# Patient Record
Sex: MALE | Race: WHITE
[De-identification: names, ages, dates, MRNs, and addresses within clinical notes are randomized per-mention and may not be internally consistent; named-entity substitution may affect disease eponyms.]

---

## 2017-05-25 ENCOUNTER — HOSPITAL ENCOUNTER (OUTPATIENT)
Dept: HOSPITAL 53 - M SFHCCLAY | Age: 67
End: 2017-05-25
Attending: NURSE PRACTITIONER
Payer: MEDICARE

## 2017-05-25 DIAGNOSIS — E78.4: ICD-10-CM

## 2017-05-25 DIAGNOSIS — I10: Primary | ICD-10-CM

## 2017-05-25 DIAGNOSIS — E11.69: ICD-10-CM

## 2017-05-25 DIAGNOSIS — E55.9: ICD-10-CM

## 2017-05-25 LAB
ALBUMIN SERPL BCG-MCNC: 3.4 GM/DL (ref 3.2–5.2)
ALBUMIN/GLOB SERPL: 0.94 {RATIO} (ref 1–1.93)
ALP SERPL-CCNC: 57 U/L (ref 45–117)
ALT SERPL W P-5'-P-CCNC: 28 U/L (ref 12–78)
ANION GAP SERPL CALC-SCNC: 8 MEQ/L (ref 8–16)
AST SERPL-CCNC: 20 U/L (ref 15–37)
BILIRUB SERPL-MCNC: 0.6 MG/DL (ref 0.2–1)
BUN SERPL-MCNC: 17 MG/DL (ref 7–18)
CALCIUM SERPL-MCNC: 8.6 MG/DL (ref 8.8–10.2)
CHLORIDE SERPL-SCNC: 100 MEQ/L (ref 98–107)
CHOLEST SERPL-MCNC: 179 MG/DL (ref ?–200)
CO2 SERPL-SCNC: 32 MEQ/L (ref 21–32)
CREAT SERPL-MCNC: 0.77 MG/DL (ref 0.7–1.3)
ERYTHROCYTE [DISTWIDTH] IN BLOOD BY AUTOMATED COUNT: 12.9 % (ref 11.5–14.5)
EST. AVERAGE GLUCOSE BLD GHB EST-MCNC: 131 MG/DL (ref 60–110)
GFR SERPL CREATININE-BSD FRML MDRD: > 60 ML/MIN/{1.73_M2} (ref 49–?)
GLUCOSE SERPL-MCNC: 119 MG/DL (ref 80–110)
MCH RBC QN AUTO: 31 PG (ref 27–33)
MCHC RBC AUTO-ENTMCNC: 34.8 G/DL (ref 32–36.5)
MCV RBC AUTO: 89.2 FL (ref 80–96)
PLATELET # BLD AUTO: 267 K/MM3 (ref 150–450)
POTASSIUM SERPL-SCNC: 3.3 MEQ/L (ref 3.5–5.1)
PROT SERPL-MCNC: 7 GM/DL (ref 6.4–8.2)
SODIUM SERPL-SCNC: 140 MEQ/L (ref 136–145)
TRIGL SERPL-MCNC: 134 MG/DL (ref ?–150)
WBC # BLD AUTO: 7.9 K/MM3 (ref 4–10)

## 2017-05-30 ENCOUNTER — HOSPITAL ENCOUNTER (OUTPATIENT)
Dept: HOSPITAL 53 - M SFHCCLAY | Age: 67
End: 2017-05-30
Attending: NURSE PRACTITIONER
Payer: MEDICARE

## 2017-05-30 DIAGNOSIS — D64.9: Primary | ICD-10-CM

## 2017-05-30 DIAGNOSIS — I10: ICD-10-CM

## 2017-05-30 LAB
ERYTHROCYTE [DISTWIDTH] IN BLOOD BY AUTOMATED COUNT: 13 % (ref 11.5–14.5)
FERRITIN SERPL-MCNC: 308 NG/ML (ref 26–388)
FOLATE SERPL-MCNC: 11.5 NG/ML (ref 5.4–?)
IRON SATN MFR SERPL: 22.5 % (ref 19.7–37.4)
MCH RBC QN AUTO: 30.9 PG (ref 27–33)
MCHC RBC AUTO-ENTMCNC: 35.1 G/DL (ref 32–36.5)
MCV RBC AUTO: 88.1 FL (ref 80–96)
PLATELET # BLD AUTO: 259 K/MM3 (ref 150–450)
POTASSIUM SERPL-SCNC: 3.1 MEQ/L (ref 3.5–5.1)
RETIC HEMOGLOBIN CONTENT CHR: 34.3 PG (ref 24–36)
RETICS/RBC NFR AUTO: 98 X10(9)/L (ref 17–77)
RETICS/RBC NFR: 2.4 % (ref 0.5–1.5)
TIBC SERPL-MCNC: 262 UG/DL (ref 250–450)
VIT B12 SERPL-MCNC: 449 PG/ML (ref 247–911)
WBC # BLD AUTO: 8.5 K/MM3 (ref 4–10)

## 2017-05-30 PROCEDURE — 85027 COMPLETE CBC AUTOMATED: CPT

## 2017-05-30 PROCEDURE — 82607 VITAMIN B-12: CPT

## 2017-05-30 PROCEDURE — 85046 RETICYTE/HGB CONCENTRATE: CPT

## 2017-05-30 PROCEDURE — 84132 ASSAY OF SERUM POTASSIUM: CPT

## 2017-05-30 PROCEDURE — 82728 ASSAY OF FERRITIN: CPT

## 2017-05-30 PROCEDURE — 82746 ASSAY OF FOLIC ACID SERUM: CPT

## 2017-05-30 PROCEDURE — 83550 IRON BINDING TEST: CPT

## 2017-06-13 ENCOUNTER — HOSPITAL ENCOUNTER (OUTPATIENT)
Dept: HOSPITAL 53 - M SFHCCLAY | Age: 67
End: 2017-06-13
Attending: NURSE PRACTITIONER
Payer: MEDICARE

## 2017-06-13 DIAGNOSIS — E11.69: ICD-10-CM

## 2017-06-13 DIAGNOSIS — E55.9: ICD-10-CM

## 2017-06-13 DIAGNOSIS — I10: Primary | ICD-10-CM

## 2017-06-13 DIAGNOSIS — E78.4: ICD-10-CM

## 2017-06-13 LAB
CALCIUM SERPL-MCNC: 9.2 MG/DL (ref 8.8–10.2)
CHOLEST SERPL-MCNC: 177 MG/DL (ref ?–200)
EST. AVERAGE GLUCOSE BLD GHB EST-MCNC: 126 MG/DL (ref 60–110)
POTASSIUM SERPL-SCNC: 3.9 MEQ/L (ref 3.5–5.1)
TRIGL SERPL-MCNC: 118 MG/DL (ref ?–150)

## 2017-06-23 ENCOUNTER — HOSPITAL ENCOUNTER (OUTPATIENT)
Dept: HOSPITAL 53 - M SFHCCLAY | Age: 67
End: 2017-06-23
Attending: NURSE PRACTITIONER
Payer: MEDICARE

## 2017-06-23 DIAGNOSIS — D64.9: Primary | ICD-10-CM

## 2017-06-23 LAB
ERYTHROCYTE [DISTWIDTH] IN BLOOD BY AUTOMATED COUNT: 12.8 % (ref 11.5–14.5)
IRON SATN MFR SERPL: 30 % (ref 19.7–37.4)
MCH RBC QN AUTO: 31.2 PG (ref 27–33)
MCHC RBC AUTO-ENTMCNC: 35.3 G/DL (ref 32–36.5)
MCV RBC AUTO: 88.4 FL (ref 80–96)
PLATELET # BLD AUTO: 239 K/MM3 (ref 150–450)
RETIC HEMOGLOBIN CONTENT CHR: 33.5 PG (ref 24–36)
RETICS/RBC NFR AUTO: 92 X10(9)/L (ref 17–77)
RETICS/RBC NFR: 2.2 % (ref 0.5–1.5)
TIBC SERPL-MCNC: 243 UG/DL (ref 250–450)
WBC # BLD AUTO: 7.6 K/MM3 (ref 4–10)

## 2017-06-29 ENCOUNTER — HOSPITAL ENCOUNTER (OUTPATIENT)
Dept: HOSPITAL 53 - M OPP | Age: 67
Discharge: HOME | End: 2017-06-29
Attending: SURGERY
Payer: MEDICARE

## 2017-06-29 VITALS — WEIGHT: 255 LBS | HEIGHT: 70 IN | BODY MASS INDEX: 36.51 KG/M2

## 2017-06-29 VITALS — DIASTOLIC BLOOD PRESSURE: 65 MMHG | SYSTOLIC BLOOD PRESSURE: 120 MMHG

## 2017-06-29 DIAGNOSIS — D50.9: Primary | ICD-10-CM

## 2017-06-29 DIAGNOSIS — E66.9: ICD-10-CM

## 2017-06-29 DIAGNOSIS — E78.00: ICD-10-CM

## 2017-06-29 DIAGNOSIS — Z87.891: ICD-10-CM

## 2017-06-29 DIAGNOSIS — K21.9: ICD-10-CM

## 2017-06-29 DIAGNOSIS — I10: ICD-10-CM

## 2017-06-29 DIAGNOSIS — Z79.899: ICD-10-CM

## 2017-06-29 DIAGNOSIS — E11.9: ICD-10-CM

## 2017-06-29 DIAGNOSIS — I45.10: ICD-10-CM

## 2017-06-29 DIAGNOSIS — N40.0: ICD-10-CM

## 2017-06-29 NOTE — ROOR
________________________________________________________________________________

Patient Name: Mp Hooper               Procedure Date: 6/29/2017 9:20 AM

MRN: [id number]                          Account Number: [id number]

YOB: 1950                Age: 67

Room: [facility]                            Gender: Male

Note Status: Finalized                 

________________________________________________________________________________

 

Procedure:           Colonoscopy

Indications:         Last colonoscopy: 2013, Iron deficiency anemia

Providers:           Brian Contreras MD

Referring MD:        Nahomy Gutierrez NP

Requesting Provider: 

Medicines:           Monitored Anesthesia Care

Complications:       No immediate complications.

________________________________________________________________________________

Procedure:           Pre-Anesthesia Assessment:

                     - Prior to the procedure, a History and Physical was 

                     performed, and patient medications and allergies were 

                     reviewed. The patient is competent. The risks and 

                     benefits of the procedure and the sedation options and 

                     risks were discussed with the patient. All questions were 

                     answered and informed consent was obtained. Patient 

                     identification and proposed procedure were verified by 

                     the physician, the nurse and the anesthetist in the 

                     procedure room. Mental Status Examination: alert and 

                     oriented. Airway Examination: normal oropharyngeal airway 

                     and neck mobility. CV Examination: regular rate and 

                     rhythm. Prophylactic Antibiotics: The patient does not 

                     require prophylactic antibiotics. Prior Anticoagulants: 

                     The patient has taken no previous anticoagulant or 

                     antiplatelet agents. ASA Grade Assessment: III - A 

                     patient with severe systemic disease. After reviewing the 

                     risks and benefits, the patient was deemed in 

                     satisfactory condition to undergo the procedure. The 

                     anesthesia plan was to use monitored anesthesia care 

                     (MAC). Immediately prior to administration of 

                     medications, the patient was re-assessed for adequacy to 

                     receive sedatives. The heart rate, respiratory rate, 

                     oxygen saturations, blood pressure, adequacy of pulmonary 

                     ventilation, and response to care were monitored 

                     throughout the procedure. The physical status of the 

                     patient was re-assessed after the procedure.

                     The was introduced through the anus and advanced to the 

                     cecum, identified by appendiceal orifice and ileocecal 

                     valve. The colonoscopy was performed without difficulty. 

                     The patient tolerated the procedure well. The quality of 

                     the bowel preparation was excellent.

                                                                                

Findings:

     The perianal and digital rectal examinations were normal.

     The colon (entire examined portion) appeared normal.

                                                                                

Impression:          - The entire examined colon is normal.

                     - No specimens collected.

Recommendation:      - Discharge patient to home.

                     - Resume previous diet.

                     - Continue present medications.

                     - Return to referring physician PRN.

                                                                                

 

__________________

Brian Contreras MD

6/29/2017 10:15:53 AM

Number of Addenda: 0

 

Note Initiated On: 6/29/2017 9:20 AM

Estimated Blood Loss:

     Estimated blood loss: none.

## 2017-06-29 NOTE — ROOR
________________________________________________________________________________

Patient Name: Mp Hooper               Procedure Date: 6/29/2017 9:19 AM

MRN: [id number]                          Account Number: [id number]

YOB: 1950                Age: 67

Room: [facility]                            Gender: Male

Note Status: Finalized                 

________________________________________________________________________________

 

Procedure:           Upper GI endoscopy

Indications:         Iron deficiency anemia

Providers:           Brian Contreras MD

Referring MD:        Nahomy Gutierrez NP

Requesting Provider: 

Medicines:           Monitored Anesthesia Care

Complications:       No immediate complications.

________________________________________________________________________________

Procedure:           Pre-Anesthesia Assessment:

                     - Prior to the procedure, a History and Physical was 

                     performed, and patient medications and allergies were 

                     reviewed. The patient is competent. The risks and 

                     benefits of the procedure and the sedation options and 

                     risks were discussed with the patient. All questions were 

                     answered and informed consent was obtained. Patient 

                     identification and proposed procedure were verified by 

                     the physician, the nurse and the anesthetist in the 

                     procedure room. Mental Status Examination: alert and 

                     oriented. Airway Examination: normal oropharyngeal airway 

                     and neck mobility. CV Examination: regular rate and 

                     rhythm. Prophylactic Antibiotics: The patient does not 

                     require prophylactic antibiotics. Prior Anticoagulants: 

                     The patient has taken no previous anticoagulant or 

                     antiplatelet agents. ASA Grade Assessment: III - A 

                     patient with severe systemic disease. After reviewing the 

                     risks and benefits, the patient was deemed in 

                     satisfactory condition to undergo the procedure. The 

                     anesthesia plan was to use monitored anesthesia care 

                     (MAC). Immediately prior to administration of 

                     medications, the patient was re-assessed for adequacy to 

                     receive sedatives. The heart rate, respiratory rate, 

                     oxygen saturations, blood pressure, adequacy of pulmonary 

                     ventilation, and response to care were monitored 

                     throughout the procedure. The physical status of the 

                     patient was re-assessed after the procedure.

                     The Endoscope was introduced through the mouth, and 

                     advanced to the second part of duodenum. The upper GI 

                     endoscopy was accomplished without difficulty. The 

                     patient tolerated the procedure well.

                                                                                

Findings:

     The first portion of the duodenum and second portion of the duodenum were 

     normal.

     Localized mucosal changes characterized by flattening and smoothness that 

     was suggestive of scar from a prior ulcer were found at the incisura.

     The exam of the stomach was otherwise normal.

     The examined esophagus was normal.

                                                                                

Impression:          - Normal first portion of the duodenum and second portion 

                     of the duodenum.

                     - Flattening and smoothness that was suggestive of scar 

                     from a prior ulcer mucosa in the incisura.

                     - Normal esophagus.

                     - No specimens collected.

Recommendation:      - Discharge patient to home.

                     - Resume previous diet.

                     - Continue present medications.

                                                                                

 

__________________

Brian Contreras MD

6/29/2017 10:10:55 AM

Number of Addenda: 0

 

Note Initiated On: 6/29/2017 9:19 AM

Estimated Blood Loss:

     Estimated blood loss: none.

## 2017-07-10 ENCOUNTER — HOSPITAL ENCOUNTER (OUTPATIENT)
Dept: HOSPITAL 53 - M SFHCCLAY | Age: 67
End: 2017-07-10
Attending: NURSE PRACTITIONER
Payer: MEDICARE

## 2017-07-10 DIAGNOSIS — D64.9: Primary | ICD-10-CM

## 2017-07-10 DIAGNOSIS — I10: ICD-10-CM

## 2017-07-10 DIAGNOSIS — Z12.5: ICD-10-CM

## 2017-07-10 LAB
ERYTHROCYTE [DISTWIDTH] IN BLOOD BY AUTOMATED COUNT: 12.7 % (ref 11.5–14.5)
IRON SATN MFR SERPL: 33.3 % (ref 19.7–37.4)
MCH RBC QN AUTO: 31.4 PG (ref 27–33)
MCHC RBC AUTO-ENTMCNC: 35.4 G/DL (ref 32–36.5)
MCV RBC AUTO: 88.7 FL (ref 80–96)
PLATELET # BLD AUTO: 252 K/MM3 (ref 150–450)
POTASSIUM SERPL-SCNC: 3.9 MEQ/L (ref 3.5–5.1)
TIBC SERPL-MCNC: 255 UG/DL (ref 250–450)
WBC # BLD AUTO: 7.7 K/MM3 (ref 4–10)

## 2017-07-10 PROCEDURE — 84132 ASSAY OF SERUM POTASSIUM: CPT

## 2017-07-10 PROCEDURE — 85027 COMPLETE CBC AUTOMATED: CPT

## 2017-07-10 PROCEDURE — 83550 IRON BINDING TEST: CPT

## 2017-09-18 ENCOUNTER — HOSPITAL ENCOUNTER (OUTPATIENT)
Dept: HOSPITAL 53 - M SFHCCLAY | Age: 67
End: 2017-09-18
Attending: NURSE PRACTITIONER
Payer: MEDICARE

## 2017-09-18 DIAGNOSIS — E78.4: ICD-10-CM

## 2017-09-18 DIAGNOSIS — E55.9: Primary | ICD-10-CM

## 2017-09-18 DIAGNOSIS — K21.9: ICD-10-CM

## 2017-09-18 DIAGNOSIS — E11.69: ICD-10-CM

## 2017-09-18 DIAGNOSIS — I10: ICD-10-CM

## 2017-09-18 LAB
CALCIUM SERPL-MCNC: 9 MG/DL (ref 8.8–10.2)
CHOLEST SERPL-MCNC: 177 MG/DL (ref ?–200)
ERYTHROCYTE [DISTWIDTH] IN BLOOD BY AUTOMATED COUNT: 12.9 % (ref 11.5–14.5)
EST. AVERAGE GLUCOSE BLD GHB EST-MCNC: 126 MG/DL (ref 60–110)
IRON SATN MFR SERPL: 32.9 % (ref 19.7–50)
MCH RBC QN AUTO: 30.7 PG (ref 27–33)
MCHC RBC AUTO-ENTMCNC: 34.1 G/DL (ref 32–36.5)
MCV RBC AUTO: 90 FL (ref 80–96)
PLATELET # BLD AUTO: 234 K/MM3 (ref 150–450)
POTASSIUM SERPL-SCNC: 3.9 MEQ/L (ref 3.5–5.1)
TIBC SERPL-MCNC: 231 UG/DL (ref 250–450)
TRIGL SERPL-MCNC: 170 MG/DL (ref ?–150)
WBC # BLD AUTO: 6.8 K/MM3 (ref 4–10)

## 2017-09-22 ENCOUNTER — HOSPITAL ENCOUNTER (OUTPATIENT)
Dept: HOSPITAL 53 - M LABSMT | Age: 67
End: 2017-09-22
Attending: SPECIALIST
Payer: MEDICARE

## 2017-09-22 DIAGNOSIS — N40.0: Primary | ICD-10-CM

## 2017-09-26 LAB — PSA SERPL-MCNC: 3.7 NG/ML (ref 0–4)

## 2017-11-30 ENCOUNTER — HOSPITAL ENCOUNTER (OUTPATIENT)
Dept: HOSPITAL 53 - M SFHCCLAY | Age: 67
End: 2017-11-30
Attending: NURSE PRACTITIONER
Payer: MEDICARE

## 2017-11-30 DIAGNOSIS — E55.9: ICD-10-CM

## 2017-11-30 DIAGNOSIS — K21.9: Primary | ICD-10-CM

## 2017-11-30 DIAGNOSIS — E11.69: ICD-10-CM

## 2017-11-30 DIAGNOSIS — E78.4: ICD-10-CM

## 2017-11-30 LAB
CHOLEST SERPL-MCNC: 185 MG/DL (ref ?–200)
ERYTHROCYTE [DISTWIDTH] IN BLOOD BY AUTOMATED COUNT: 12.7 % (ref 11.5–14.5)
EST. AVERAGE GLUCOSE BLD GHB EST-MCNC: 134 MG/DL (ref 60–110)
IRON SATN MFR SERPL: 35.5 % (ref 19.7–50)
MCH RBC QN AUTO: 30.4 PG (ref 27–33)
MCHC RBC AUTO-ENTMCNC: 34.6 G/DL (ref 32–36.5)
MCV RBC AUTO: 87.8 FL (ref 80–96)
NRBC BLD AUTO-RTO: 0 % (ref 0–0)
PLATELET # BLD AUTO: 240 10^3/UL (ref 150–450)
TIBC SERPL-MCNC: 256 UG/DL (ref 250–450)
TRIGL SERPL-MCNC: 190 MG/DL (ref ?–150)
WBC # BLD AUTO: 6.5 10^3/UL (ref 4–10)

## 2018-04-13 ENCOUNTER — HOSPITAL ENCOUNTER (OUTPATIENT)
Dept: HOSPITAL 53 - M SFHCCLAY | Age: 68
End: 2018-04-13
Attending: NURSE PRACTITIONER
Payer: MEDICARE

## 2018-04-13 DIAGNOSIS — E55.9: ICD-10-CM

## 2018-04-13 DIAGNOSIS — E78.4: ICD-10-CM

## 2018-04-13 DIAGNOSIS — E11.69: ICD-10-CM

## 2018-04-13 DIAGNOSIS — K21.9: Primary | ICD-10-CM

## 2018-04-13 LAB
25(OH)D3 SERPL-MCNC: 32.1 NG/ML (ref 30–100)
CHOLEST SERPL-MCNC: 175 MG/DL (ref ?–200)
CHOLESTEROL RISK RATIO: 3.98 (ref ?–5)
EST. AVERAGE GLUCOSE BLD GHB EST-MCNC: 131 MG/DL (ref 60–110)
FERRITIN: 242 NG/ML (ref 26–388)
FOLATE SERPL-MCNC: 13.5 NG/ML (ref 5.4–?)
HDLC SERPL-MCNC: 44 MG/DL (ref 40–?)
HEMATOCRIT: 37.3 % (ref 42–52)
HEMOGLOBIN: 13.1 G/DL (ref 13.5–17.5)
IRON (FE): 72 UG/DL (ref 65–175)
IRON SATN MFR SERPL: 29.9 % (ref 19.7–50)
LDL CHOLESTEROL: 104.4 MG/DL (ref ?–100)
MEAN CORPUSCULAR HEMOGLOBIN: 30.1 PG (ref 27–33)
MEAN CORPUSCULAR HGB CONC: 35.1 G/DL (ref 32–36.5)
MEAN CORPUSCULAR VOLUME: 85.7 FL (ref 80–96)
NONHDLC SERPL-MCNC: 131 MG/DL
NRBC BLD AUTO-RTO: 0 % (ref 0–0)
PLATELET COUNT, AUTOMATED: 245 10^3/UL (ref 150–450)
PSA SERPL-MCNC: 4.68 NG/ML (ref ?–4)
RED BLOOD COUNT: 4.35 10^6/UL (ref 4.3–6.1)
RED CELL DISTRIBUTION WIDTH: 12.8 % (ref 11.5–14.5)
RETIC HEMOGLOBIN EQUIVALENT: 35.2 PG (ref 24–36)
RETICS/RBC NFR: 1.9 % (ref 0.5–1.5)
RETICULOCYTE #: 81.8 10^9/L (ref 17–77)
TOTAL IRON BINDING CAPACITY: 241 UG/DL (ref 250–450)
TRIGLYCERIDES LEVEL: 133 MG/DL (ref ?–150)
VITAMIN B12 LEVEL: 395 PG/ML (ref 247–911)
WHITE BLOOD COUNT: 7.2 10^3/UL (ref 4–10)

## 2018-04-13 PROCEDURE — 82746 ASSAY OF FOLIC ACID SERUM: CPT

## 2018-08-13 ENCOUNTER — HOSPITAL ENCOUNTER (OUTPATIENT)
Dept: HOSPITAL 53 - M SFHCCLAY | Age: 68
End: 2018-08-13
Attending: NURSE PRACTITIONER
Payer: MEDICARE

## 2018-08-13 DIAGNOSIS — E78.4: ICD-10-CM

## 2018-08-13 DIAGNOSIS — E11.69: Primary | ICD-10-CM

## 2018-08-13 LAB
CHOLEST SERPL-MCNC: 163 MG/DL (ref ?–200)
CHOLESTEROL RISK RATIO: 4.08 (ref ?–5)
CREAT UR-MCNC: 82.6 MG/DL
EST. AVERAGE GLUCOSE BLD GHB EST-MCNC: 128 MG/DL (ref 60–110)
HDLC SERPL-MCNC: 40 MG/DL (ref 40–?)
LDL CHOLESTEROL: 78.8 MG/DL (ref ?–100)
MICROALBUMIN UR-MCNC: 5.3 MG/L
MICROALBUMIN/CREAT UR: 6.4 MCG/MG (ref 0–30)
NONHDLC SERPL-MCNC: 123 MG/DL
TOTAL PROTEIN,RANDOM URINE: 9 MG/DL (ref 0–12)
TRIGLYCERIDES LEVEL: 221 MG/DL (ref ?–150)

## 2018-08-13 PROCEDURE — 83036 HEMOGLOBIN GLYCOSYLATED A1C: CPT

## 2020-09-28 NOTE — REP
TWO VIEWS RIGHT HIP



COMPARISON: None.



FINDINGS: 

There is slight femoral head marginal osteophytosis. There is mild-to-moderate 
asymmetric hip joint space narrowing. There is no buttressing. There is no acute
fracture, dislocation, or subluxation. 



IMPRESSION: 

Chronic changes as described above.  

MTDD

## 2021-06-01 NOTE — ROOR
________________________________________________________________________________

Patient Name: Mp Hooper               Procedure Date: 6/1/2021 10:24 AM

MRN: [id number]                          Account Number: [id number]

YOB: 1950                Age: 71

Room: [facility]                            Gender: Male

Note Status: Finalized                 

________________________________________________________________________________

 

Procedure:            Colonoscopy

Indications:          Iron deficiency anemia

Providers:            DO Isidro Maher MD:         Nahomy Gutierrez NP

Requesting Provider:  

Medicines:            Propofol per Anesthesia

Complications:        No immediate complications.

________________________________________________________________________________

Procedure:            Pre-Anesthesia Assessment:

                      - Prior to the procedure, a History and Physical was 

                      performed, and patient medications and allergies were 

                      reviewed. The patient is competent. The risks and 

                      benefits of the procedure and the sedation options and 

                      risks were discussed with the patient. All questions 

                      were answered and informed consent was obtained. Patient 

                      identification and proposed procedure were verified by 

                      the physician, the nurse, the anesthetist and the 

                      technician in the endoscopy suite. Mental Status 

                      Examination: alert and oriented. Airway Examination: 

                      normal oropharyngeal airway and neck mobility. 

                      Respiratory Examination: clear to auscultation. CV 

                      Examination: normal. Prophylactic Antibiotics: The 

                      patient does not require prophylactic antibiotics. Prior 

                      Anticoagulants: The patient has taken no previous 

                      anticoagulant or antiplatelet agents. ASA Grade 

                      Assessment: II - A patient with mild systemic disease. 

                      After reviewing the risks and benefits, the patient was 

                      deemed in satisfactory condition to undergo the 

                      procedure. The anesthesia plan was to use monitored 

                      anesthesia care (MAC). Immediately prior to 

                      administration of medications, the patient was 

                      re-assessed for adequacy to receive sedatives. The heart 

                      rate, respiratory rate, oxygen saturations, blood 

                      pressure, adequacy of pulmonary ventilation, and 

                      response to care were monitored throughout the 

                      procedure. The physical status of the patient was 

                      re-assessed after the procedure.

                      The Colonoscope was introduced through the anus and 

                      advanced to the cecum, identified by appendiceal orifice 

                      and ileocecal valve. The colonoscopy was performed 

                      without difficulty. The patient tolerated the procedure 

                      well.

                                                                                

Findings:

     Non-bleeding internal hemorrhoids were found during retroflexion. The 

     hemorrhoids were Grade I (internal hemorrhoids that do not prolapse).

     The exam was otherwise without abnormality.

                                                                                

Impression:           - Non-bleeding internal hemorrhoids.

                      - The examination was otherwise normal.

                      - No specimens collected.

Recommendation:       - Patient has a contact number available for 

                      emergencies. The signs and symptoms of potential delayed 

                      complications were discussed with the patient. Return to 

                      normal activities tomorrow. Written discharge 

                      instructions were provided to the patient.

                      - Repeat colonoscopy in 5-10 years for screening 

                      purposes.

                      - Return to my office as previously scheduled.

                                                                                

Procedure Code(s):    --- Professional ---

                      34041, Colonoscopy, flexible; diagnostic, including 

                      collection of specimen(s) by brushing or washing, when 

                      performed (separate procedure)

Diagnosis Code(s):    --- Professional ---

                      K64.0, First degree hemorrhoids

                      D50.9, Iron deficiency anemia, unspecified

 

CPT copyright 2019 [employer]. All rights reserved.

 

The codes documented in this report are preliminary and upon  review may 

be revised to meet current compliance requirements.

 

_________________

Juan Luis Porter DO

6/1/2021 10:54:41 AM

Electronically signed by Juan Luis Porter DO

Number of Addenda: 0

 

Note Initiated On: 6/1/2021 10:24 AM

Estimated Blood Loss: Estimated blood loss: none.

## 2021-06-01 NOTE — ROOR
________________________________________________________________________________

Patient Name: Mp Hooper               Procedure Date: 6/1/2021 10:22 AM

MRN: [id number]                          Account Number: [id number]

YOB: 1950                Age: 71

Room: [facility]                            Gender: Male

Note Status: Finalized                 

________________________________________________________________________________

 

Procedure:            Upper GI endoscopy

Indications:          Iron deficiency anemia

Providers:            DO Isidro Maher MD:         Nahomy Gutierrez NP

Requesting Provider:  

Medicines:            Propofol per Anesthesia

Complications:        No immediate complications.

________________________________________________________________________________

Procedure:            Pre-Anesthesia Assessment:

                      - Prior to the procedure, a History and Physical was 

                      performed, and patient medications and allergies were 

                      reviewed. The patient is competent. The risks and 

                      benefits of the procedure and the sedation options and 

                      risks were discussed with the patient. All questions 

                      were answered and informed consent was obtained. Patient 

                      identification and proposed procedure were verified by 

                      the physician, the nurse, the anesthetist and the 

                      technician in the endoscopy suite. Mental Status 

                      Examination: alert and oriented. Airway Examination: 

                      normal oropharyngeal airway and neck mobility. 

                      Respiratory Examination: clear to auscultation. CV 

                      Examination: normal. Prophylactic Antibiotics: The 

                      patient does not require prophylactic antibiotics. Prior 

                      Anticoagulants: The patient has taken no previous 

                      anticoagulant or antiplatelet agents. ASA Grade 

                      Assessment: II - A patient with mild systemic disease. 

                      After reviewing the risks and benefits, the patient was 

                      deemed in satisfactory condition to undergo the 

                      procedure. The anesthesia plan was to use monitored 

                      anesthesia care (MAC). Immediately prior to 

                      administration of medications, the patient was 

                      re-assessed for adequacy to receive sedatives. The heart 

                      rate, respiratory rate, oxygen saturations, blood 

                      pressure, adequacy of pulmonary ventilation, and 

                      response to care were monitored throughout the 

                      procedure. The physical status of the patient was 

                      re-assessed after the procedure.

                      The Endoscope was introduced through the mouth, and 

                      advanced to the second part of duodenum. The upper GI 

                      endoscopy was accomplished without difficulty. The 

                      patient tolerated the procedure well.

                                                                                

Findings:

     One non-bleeding superficial gastric ulcer with no stigmata of bleeding 

     was found in the prepyloric region of the stomach. Biopsies were taken 

     with a cold forceps for histology. Estimated blood loss was minimal.

     The exam was otherwise without abnormality.

                                                                                

Impression:           - Non-bleeding gastric ulcer with no stigmata of 

                      bleeding. Biopsied.

                      - The examination was otherwise normal.

Recommendation:       - Patient has a contact number available for 

                      emergencies. The signs and symptoms of potential delayed 

                      complications were discussed with the patient. Return to 

                      normal activities tomorrow. Written discharge 

                      instructions were provided to the patient.

                      - Await pathology results.

                      - Return to my office as previously scheduled.

                                                                                

Procedure Code(s):    --- Professional ---

                      24226, Esophagogastroduodenoscopy, flexible, transoral; 

                      with biopsy, single or multiple

Diagnosis Code(s):    --- Professional ---

                      K25.9, Gastric ulcer, unspecified as acute or chronic, 

                      without hemorrhage or perforation

                      D50.9, Iron deficiency anemia, unspecified

 

CPT copyright 2019 [employer]. All rights reserved.

 

The codes documented in this report are preliminary and upon  review may 

be revised to meet current compliance requirements.

 

_________________

Juan Luis Porter DO

6/1/2021 10:52:48 AM

Electronically signed by Juan Luis Porter DO

Number of Addenda: 0

 

Note Initiated On: 6/1/2021 10:22 AM

Estimated Blood Loss: Estimated blood loss was minimal.

## 2021-08-07 NOTE — REPVR
PROCEDURE INFORMATION: 

Exam: MR Left Lower Extremity Joint Without Contrast, Knee 

Exam date and time: 8/7/2021 10:42 AM 

Age: 71 years old 

Clinical indication: Pain; Knee; Left; Additional info: Lt knee pain internal 

dergangement 



TECHNIQUE: 

Imaging protocol: MR of the Left lower extremity joint without contrast. Exam 

focused on the knee. 

Total images: 208 



COMPARISON: 

XR left knee 04/6/2021. At the time of dictation, the images of the prior study 

are available, but the report is not. 



FINDINGS: 

Bones and cartilage: At the patellofemoral joint, there is no osteoarthrosis or 

chondromalacia. At the medial joint compartment, there is Grade 3 

chondromalacia at the weight-bearing surfaces of the medial femoral condyle and 

medial tibial plateau are approximately 30 x 18 mm, which causes direct 

bone-on-bone abutment with subchondral bone marrow edema (series 601 frame 22 

and series 301 frame 25). There small marginal osteophytes posteriorly. At the 

lateral joint compartment, there is no osteoarthrosis or chondromalacia. 

Joint spaces: Mild/moderate joint effusion.  There are prominent medial and 

superior patellar plica.

Fat pads of knee: Unremarkable. 

Bursae: No popliteal cyst at the gastrocnemius-semimembranosus bursa. 

Medial meniscus: At the posterior body of the medial meniscus, there is 

meniscal degeneration, attenuation and blunting without tear. At the posterior 

horn of the medial meniscus, a horizontal longitudinal tear extends to the 

inferior articular surface. A parameniscal cyst superficial to this tear is 2.7 

x 0.7 x 1.9 cm (TR x AP x CC) (series 301 frame 22; series 601 frame 30 and 

series 401 frame 15). The anterior horn is intact. 

Lateral meniscus: Unremarkable. No tear. 



Anterior cruciate ligament: Unremarkable. No tear. 

Posterior cruciate ligament: Unremarkable. No tear. 

Medial capsule and supporting structures: The medial collateral ligament is 

unremarkable. No tear. 

Lateral capsule and supporting structures: The lateral collateral ligament is 

unremarkable. No tear. 

Extensor mechanism of knee: The quadriceps and patellar tendons are 

unremarkable. No tear. 



Muscles: Unremarkable. 

Soft tissues: Unremarkable. 

Other findings: 1 



IMPRESSION: 

1. At the posterior horn of the medial meniscus, a horizontal longitudinal tear 

extends to the inferior articular surface. A parameniscal cyst superficial to 

this tear is 2.7 x 0.7 x 1.9 cm. There is degenerative attenuation without tear 

at the posterior body of the medial meniscus. 

2. At the medial joint compartment, there are full-thickness cartilage defects, 

30 x 18 mm, with direct bone-on-bone abutment and subchondral bone marrow 

edema. 

3. Mild/moderate joint effusion. 

4. All ligaments are intact. 









Electronically signed by: Robin Baxter On 08/07/2021  16:07:33 PM